# Patient Record
Sex: MALE | Race: BLACK OR AFRICAN AMERICAN | NOT HISPANIC OR LATINO | URBAN - METROPOLITAN AREA
[De-identification: names, ages, dates, MRNs, and addresses within clinical notes are randomized per-mention and may not be internally consistent; named-entity substitution may affect disease eponyms.]

---

## 2021-09-10 ENCOUNTER — INPATIENT (INPATIENT)
Facility: HOSPITAL | Age: 41
LOS: 3 days | Discharge: HOME | DRG: 935 | End: 2021-09-14
Attending: SURGERY | Admitting: SURGERY
Payer: COMMERCIAL

## 2021-09-10 VITALS
RESPIRATION RATE: 18 BRPM | OXYGEN SATURATION: 100 % | DIASTOLIC BLOOD PRESSURE: 95 MMHG | HEART RATE: 70 BPM | SYSTOLIC BLOOD PRESSURE: 156 MMHG
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
## 2021-09-10 LAB
ABO RH CONFIRMATION: SIGNIFICANT CHANGE UP
ALBUMIN SERPL ELPH-MCNC: 4.2 G/DL — SIGNIFICANT CHANGE UP (ref 3.3–5.2)
ALP SERPL-CCNC: 65 U/L — SIGNIFICANT CHANGE UP (ref 40–120)
ALT FLD-CCNC: 30 U/L — SIGNIFICANT CHANGE UP
ANION GAP SERPL CALC-SCNC: 12 MMOL/L — SIGNIFICANT CHANGE UP (ref 5–17)
APPEARANCE UR: CLEAR — SIGNIFICANT CHANGE UP
APTT BLD: 22.8 SEC — LOW (ref 27.5–35.5)
AST SERPL-CCNC: 74 U/L — HIGH
BACTERIA # UR AUTO: ABNORMAL
BASE EXCESS BLDV CALC-SCNC: -1.6 MMOL/L — SIGNIFICANT CHANGE UP (ref -2–3)
BASOPHILS # BLD AUTO: 0.03 K/UL — SIGNIFICANT CHANGE UP (ref 0–0.2)
BASOPHILS NFR BLD AUTO: 0.5 % — SIGNIFICANT CHANGE UP (ref 0–2)
BILIRUB SERPL-MCNC: 0.2 MG/DL — LOW (ref 0.4–2)
BILIRUB UR-MCNC: NEGATIVE — SIGNIFICANT CHANGE UP
BLD GP AB SCN SERPL QL: SIGNIFICANT CHANGE UP
BUN SERPL-MCNC: 12 MG/DL — SIGNIFICANT CHANGE UP (ref 8–20)
CALCIUM SERPL-MCNC: 8.3 MG/DL — LOW (ref 8.6–10.2)
CHLORIDE SERPL-SCNC: 106 MMOL/L — SIGNIFICANT CHANGE UP (ref 98–107)
CO2 SERPL-SCNC: 22 MMOL/L — SIGNIFICANT CHANGE UP (ref 22–29)
COLOR SPEC: YELLOW — SIGNIFICANT CHANGE UP
CREAT SERPL-MCNC: 1 MG/DL — SIGNIFICANT CHANGE UP (ref 0.5–1.3)
DIFF PNL FLD: NEGATIVE — SIGNIFICANT CHANGE UP
EOSINOPHIL # BLD AUTO: 0.16 K/UL — SIGNIFICANT CHANGE UP (ref 0–0.5)
EOSINOPHIL NFR BLD AUTO: 2.9 % — SIGNIFICANT CHANGE UP (ref 0–6)
EPI CELLS # UR: SIGNIFICANT CHANGE UP
ETHANOL SERPL-MCNC: <10 MG/DL — SIGNIFICANT CHANGE UP (ref 0–9)
GAS PNL BLDV: SIGNIFICANT CHANGE UP
GLUCOSE SERPL-MCNC: 156 MG/DL — HIGH (ref 70–99)
GLUCOSE UR QL: NEGATIVE MG/DL — SIGNIFICANT CHANGE UP
HCO3 BLDV-SCNC: 24 MMOL/L — SIGNIFICANT CHANGE UP (ref 22–29)
HCT VFR BLD CALC: 39.7 % — SIGNIFICANT CHANGE UP (ref 39–50)
HGB BLD-MCNC: 13.3 G/DL — SIGNIFICANT CHANGE UP (ref 13–17)
IMM GRANULOCYTES NFR BLD AUTO: 1.1 % — SIGNIFICANT CHANGE UP (ref 0–1.5)
INR BLD: 1 RATIO — SIGNIFICANT CHANGE UP (ref 0.88–1.16)
KETONES UR-MCNC: NEGATIVE — SIGNIFICANT CHANGE UP
LACTATE BLDV-MCNC: 1.4 MMOL/L — SIGNIFICANT CHANGE UP (ref 0.5–2)
LEUKOCYTE ESTERASE UR-ACNC: NEGATIVE — SIGNIFICANT CHANGE UP
LIDOCAIN IGE QN: 39 U/L — SIGNIFICANT CHANGE UP (ref 22–51)
LYMPHOCYTES # BLD AUTO: 2.33 K/UL — SIGNIFICANT CHANGE UP (ref 1–3.3)
LYMPHOCYTES # BLD AUTO: 41.5 % — SIGNIFICANT CHANGE UP (ref 13–44)
MCHC RBC-ENTMCNC: 31.7 PG — SIGNIFICANT CHANGE UP (ref 27–34)
MCHC RBC-ENTMCNC: 33.5 GM/DL — SIGNIFICANT CHANGE UP (ref 32–36)
MCV RBC AUTO: 94.7 FL — SIGNIFICANT CHANGE UP (ref 80–100)
MONOCYTES # BLD AUTO: 0.27 K/UL — SIGNIFICANT CHANGE UP (ref 0–0.9)
MONOCYTES NFR BLD AUTO: 4.8 % — SIGNIFICANT CHANGE UP (ref 2–14)
NEUTROPHILS # BLD AUTO: 2.76 K/UL — SIGNIFICANT CHANGE UP (ref 1.8–7.4)
NEUTROPHILS NFR BLD AUTO: 49.2 % — SIGNIFICANT CHANGE UP (ref 43–77)
NITRITE UR-MCNC: NEGATIVE — SIGNIFICANT CHANGE UP
PCO2 BLDV: 46 MMHG — SIGNIFICANT CHANGE UP (ref 42–55)
PH BLDV: 7.33 — SIGNIFICANT CHANGE UP (ref 7.32–7.43)
PH UR: 6.5 — SIGNIFICANT CHANGE UP (ref 5–8)
PLATELET # BLD AUTO: 167 K/UL — SIGNIFICANT CHANGE UP (ref 150–400)
PO2 BLDV: 163 MMHG — HIGH (ref 25–45)
POTASSIUM SERPL-MCNC: 4.3 MMOL/L — SIGNIFICANT CHANGE UP (ref 3.5–5.3)
POTASSIUM SERPL-SCNC: 4.3 MMOL/L — SIGNIFICANT CHANGE UP (ref 3.5–5.3)
PROT SERPL-MCNC: 6.9 G/DL — SIGNIFICANT CHANGE UP (ref 6.6–8.7)
PROT UR-MCNC: 30 MG/DL
PROTHROM AB SERPL-ACNC: 11.6 SEC — SIGNIFICANT CHANGE UP (ref 10.6–13.6)
RBC # BLD: 4.19 M/UL — LOW (ref 4.2–5.8)
RBC # FLD: 13.1 % — SIGNIFICANT CHANGE UP (ref 10.3–14.5)
RBC CASTS # UR COMP ASSIST: SIGNIFICANT CHANGE UP /HPF (ref 0–4)
SAO2 % BLDV: 100 % — SIGNIFICANT CHANGE UP
SARS-COV-2 RNA SPEC QL NAA+PROBE: SIGNIFICANT CHANGE UP
SODIUM SERPL-SCNC: 139 MMOL/L — SIGNIFICANT CHANGE UP (ref 135–145)
SP GR SPEC: 1.01 — SIGNIFICANT CHANGE UP (ref 1.01–1.02)
UROBILINOGEN FLD QL: NEGATIVE MG/DL — SIGNIFICANT CHANGE UP
WBC # BLD: 5.61 K/UL — SIGNIFICANT CHANGE UP (ref 3.8–10.5)
WBC # FLD AUTO: 5.61 K/UL — SIGNIFICANT CHANGE UP (ref 3.8–10.5)
WBC UR QL: SIGNIFICANT CHANGE UP

## 2021-09-10 PROCEDURE — 32551 INSERTION OF CHEST TUBE: CPT | Mod: GC

## 2021-09-10 PROCEDURE — 70450 CT HEAD/BRAIN W/O DYE: CPT | Mod: 26,MA

## 2021-09-10 PROCEDURE — 71045 X-RAY EXAM CHEST 1 VIEW: CPT | Mod: 26

## 2021-09-10 PROCEDURE — 74177 CT ABD & PELVIS W/CONTRAST: CPT | Mod: 26,MA

## 2021-09-10 PROCEDURE — 99156 MOD SED OTH PHYS/QHP 5/>YRS: CPT

## 2021-09-10 PROCEDURE — 99223 1ST HOSP IP/OBS HIGH 75: CPT | Mod: GC,25

## 2021-09-10 PROCEDURE — 99157 MOD SED OTHER PHYS/QHP EA: CPT

## 2021-09-10 PROCEDURE — 71260 CT THORAX DX C+: CPT | Mod: 26,MA

## 2021-09-10 PROCEDURE — 73000 X-RAY EXAM OF COLLAR BONE: CPT | Mod: 26,LT

## 2021-09-10 PROCEDURE — 99291 CRITICAL CARE FIRST HOUR: CPT

## 2021-09-10 PROCEDURE — 99053 MED SERV 10PM-8AM 24 HR FAC: CPT

## 2021-09-10 PROCEDURE — 72170 X-RAY EXAM OF PELVIS: CPT | Mod: 26

## 2021-09-10 PROCEDURE — 73070 X-RAY EXAM OF ELBOW: CPT | Mod: 26,RT,76

## 2021-09-10 PROCEDURE — 71045 X-RAY EXAM CHEST 1 VIEW: CPT | Mod: 26,77

## 2021-09-10 PROCEDURE — 72125 CT NECK SPINE W/O DYE: CPT | Mod: 26,MA

## 2021-09-10 RX ORDER — OXYCODONE HYDROCHLORIDE 5 MG/1
10 TABLET ORAL EVERY 6 HOURS
Refills: 0 | Status: DISCONTINUED | OUTPATIENT
Start: 2021-09-10 | End: 2021-09-14

## 2021-09-10 RX ORDER — KETAMINE HYDROCHLORIDE 100 MG/ML
50 INJECTION INTRAMUSCULAR; INTRAVENOUS ONCE
Refills: 0 | Status: DISCONTINUED | OUTPATIENT
Start: 2021-09-10 | End: 2021-09-10

## 2021-09-10 RX ORDER — ONDANSETRON 8 MG/1
4 TABLET, FILM COATED ORAL ONCE
Refills: 0 | Status: COMPLETED | OUTPATIENT
Start: 2021-09-10 | End: 2021-09-10

## 2021-09-10 RX ORDER — ENOXAPARIN SODIUM 100 MG/ML
40 INJECTION SUBCUTANEOUS DAILY
Refills: 0 | Status: DISCONTINUED | OUTPATIENT
Start: 2021-09-10 | End: 2021-09-14

## 2021-09-10 RX ORDER — SENNA PLUS 8.6 MG/1
2 TABLET ORAL AT BEDTIME
Refills: 0 | Status: DISCONTINUED | OUTPATIENT
Start: 2021-09-10 | End: 2021-09-14

## 2021-09-10 RX ORDER — BACITRACIN ZINC 500 UNIT/G
1 OINTMENT IN PACKET (EA) TOPICAL
Refills: 0 | Status: DISCONTINUED | OUTPATIENT
Start: 2021-09-10 | End: 2021-09-14

## 2021-09-10 RX ORDER — OXYCODONE HYDROCHLORIDE 5 MG/1
5 TABLET ORAL EVERY 6 HOURS
Refills: 0 | Status: DISCONTINUED | OUTPATIENT
Start: 2021-09-10 | End: 2021-09-14

## 2021-09-10 RX ORDER — LIDOCAINE 4 G/100G
1 CREAM TOPICAL DAILY
Refills: 0 | Status: DISCONTINUED | OUTPATIENT
Start: 2021-09-10 | End: 2021-09-14

## 2021-09-10 RX ORDER — HYDROMORPHONE HYDROCHLORIDE 2 MG/ML
1 INJECTION INTRAMUSCULAR; INTRAVENOUS; SUBCUTANEOUS
Refills: 0 | Status: DISCONTINUED | OUTPATIENT
Start: 2021-09-10 | End: 2021-09-14

## 2021-09-10 RX ORDER — SODIUM CHLORIDE 9 MG/ML
1000 INJECTION, SOLUTION INTRAVENOUS
Refills: 0 | Status: DISCONTINUED | OUTPATIENT
Start: 2021-09-10 | End: 2021-09-12

## 2021-09-10 RX ORDER — ACETAMINOPHEN 500 MG
975 TABLET ORAL EVERY 8 HOURS
Refills: 0 | Status: DISCONTINUED | OUTPATIENT
Start: 2021-09-10 | End: 2021-09-14

## 2021-09-10 RX ORDER — TETANUS TOXOID, REDUCED DIPHTHERIA TOXOID AND ACELLULAR PERTUSSIS VACCINE, ADSORBED 5; 2.5; 8; 8; 2.5 [IU]/.5ML; [IU]/.5ML; UG/.5ML; UG/.5ML; UG/.5ML
0.5 SUSPENSION INTRAMUSCULAR ONCE
Refills: 0 | Status: COMPLETED | OUTPATIENT
Start: 2021-09-10 | End: 2021-09-10

## 2021-09-10 RX ADMIN — HYDROMORPHONE HYDROCHLORIDE 1 MILLIGRAM(S): 2 INJECTION INTRAMUSCULAR; INTRAVENOUS; SUBCUTANEOUS at 21:44

## 2021-09-10 RX ADMIN — Medication 975 MILLIGRAM(S): at 13:48

## 2021-09-10 RX ADMIN — LIDOCAINE 1 PATCH: 4 CREAM TOPICAL at 18:44

## 2021-09-10 RX ADMIN — Medication 1 APPLICATION(S): at 17:00

## 2021-09-10 RX ADMIN — TETANUS TOXOID, REDUCED DIPHTHERIA TOXOID AND ACELLULAR PERTUSSIS VACCINE, ADSORBED 0.5 MILLILITER(S): 5; 2.5; 8; 8; 2.5 SUSPENSION INTRAMUSCULAR at 07:55

## 2021-09-10 RX ADMIN — ONDANSETRON 4 MILLIGRAM(S): 8 TABLET, FILM COATED ORAL at 23:53

## 2021-09-10 RX ADMIN — ENOXAPARIN SODIUM 40 MILLIGRAM(S): 100 INJECTION SUBCUTANEOUS at 13:47

## 2021-09-10 RX ADMIN — KETAMINE HYDROCHLORIDE 50 MILLIGRAM(S): 100 INJECTION INTRAMUSCULAR; INTRAVENOUS at 08:14

## 2021-09-10 RX ADMIN — KETAMINE HYDROCHLORIDE 50 MILLIGRAM(S): 100 INJECTION INTRAMUSCULAR; INTRAVENOUS at 08:03

## 2021-09-10 RX ADMIN — LIDOCAINE 1 PATCH: 4 CREAM TOPICAL at 13:47

## 2021-09-10 RX ADMIN — Medication 975 MILLIGRAM(S): at 21:44

## 2021-09-10 RX ADMIN — SODIUM CHLORIDE 83 MILLILITER(S): 9 INJECTION, SOLUTION INTRAVENOUS at 13:49

## 2021-09-10 NOTE — PROCEDURE NOTE - ADDITIONAL PROCEDURE DETAILS
Dimitrios Alexander presents to the clinic today for management of his anticoagulation therapy in treatment of his atrial fibrillation. Target INR is 2.0-3.0. His INR today was therapeutic at 2.1 on 37.5 mg of Warfarin weekly. His previous INR was 1.8 on 09/17/18. The patient reports and denies medication changes since the last visit. He  denies diet changes since the last visit. He was able to ambulate to office without assistive device. Dimitrios will continue on his current warfarin dosing schedule of 37.5 mg weekly and return to the clinic on 10/19/18 for INR fingerstick. Onsite billing provider is Kurtis Yen MD.     
ketamine 50 mg IV x 2 during left tube thoracostomy  he was awake and answering questions appropriately after the procedure was concluded.

## 2021-09-10 NOTE — ED PROVIDER NOTE - NS ED ROS FT
ROS:  GEN: (-) fevers/chills  NECK: (-) stiffness, (-) swelling  RESP: (-) shortness of breath, (-) cough  CV: (+) chest pain, (-) palpitations  GI: (-) nausea, (-) vomiting, (+) pain, (-) constipation, (-) diarrhea  : (-) hematuria, (-) dysuria  EXT: (-) edema  NEURO: (-) weakness, (-) headache, (-) dizziness, (-) syncope  MSK: (-) muscle pain

## 2021-09-10 NOTE — ED ADULT NURSE REASSESSMENT NOTE - NS ED NURSE REASSESS COMMENT FT1
received report from sisi bush and assumed care of pt. pt sitting calm in bed. a and o x3. breathing even and unlabored. nsr on cm. pt has no complaints at this time. admitted and awaiting bed upstairs. will continue to monitor.
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
## 2021-09-10 NOTE — PROCEDURE NOTE - NSPROCSEDATIONNOT_GEN_ALL_CORE
Patient Information  -No driving while taking narcotic pain medications  -Do not take any OTC products containing acetaminophen at the same time as you take your narcotic pain medication. Medications that may contain acetaminophen include but are not limited to: Excedrin and other headache medications, arthritis medications, cold and sinus medications, etc. Please review the list of active ingredients on any OTC medication prior to taking it.  -Do not take any Aspirin or Aspirin containing products until 48 hours after surgery   -Do not take any Aleeve, Naprosyn, Naproxen, Ibuprofen, Advil or any other NSAID  -Do not consume any alcoholic beverages until released by your Neurosurgeon  -Do not perform any excessive bending over or leaning forward as this is a fall hazard.  -Do not perform any heavy lifting or lifting more than 10 lbs from the ground level as this is a fall hazard.    Contact the Neurosurgery Office immediately if:  -If you begin to notice any neurologic changes such as:           -Sudden onset of lethargy or sleepiness           -Sudden confusion, trouble speaking, or understanding            -Sudden trouble seeing in one or both eyes            -Sudden trouble walking, dizziness, loss of coordination            -Sudden severe headache with no known cause            -Sudden onset of numbness or weakness     Wound Care:  Remove bandage on the 2nd day after surgery, then keep your incision open to air. There are white tape strips called steri strips under your bandage. These will fall off on their own. Do not remove them. You may shower on Day 2. Have the stream of water hit you opposite from the incision. Do not scrub the incision. Pat the incision dry after your shower. You cannot take a bath/swim/submerge the incision until 8 weeks after surgery.    Call your doctor or go to the Emergency Room for any signs of infection including: increased redness, drainage, pain or fever (temperature greater than 
or equal to 101.4).       Miscellaneous:  -Follow up with Neurosurgery in 2 weeks for wound check. You will also have a x-ray before your clinic appointment with Dr. Guerrero for your wound check.   -Remain active with walking and other light activities daily.  No heavy lifting, no extreme bending or twisting.    -Please wear your collar at all times. It is ok to remove it when you are showering. Please do not move your head/neck when the collar is off  -Please use your rolling walker for assistance        Neurosurgery Office:   [address], [address]  [place] [address]  Telephone [phone]    
No
Yes
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
## 2021-09-11 LAB
ANION GAP SERPL CALC-SCNC: 14 MMOL/L — SIGNIFICANT CHANGE UP (ref 5–17)
BASOPHILS # BLD AUTO: 0.01 K/UL — SIGNIFICANT CHANGE UP (ref 0–0.2)
BASOPHILS NFR BLD AUTO: 0.2 % — SIGNIFICANT CHANGE UP (ref 0–2)
BUN SERPL-MCNC: 11 MG/DL — SIGNIFICANT CHANGE UP (ref 8–20)
CALCIUM SERPL-MCNC: 8.3 MG/DL — LOW (ref 8.6–10.2)
CHLORIDE SERPL-SCNC: 99 MMOL/L — SIGNIFICANT CHANGE UP (ref 98–107)
CO2 SERPL-SCNC: 24 MMOL/L — SIGNIFICANT CHANGE UP (ref 22–29)
COVID-19 SPIKE DOMAIN AB INTERP: NEGATIVE — SIGNIFICANT CHANGE UP
COVID-19 SPIKE DOMAIN ANTIBODY RESULT: 0.4 U/ML — SIGNIFICANT CHANGE UP
CREAT SERPL-MCNC: 0.85 MG/DL — SIGNIFICANT CHANGE UP (ref 0.5–1.3)
EOSINOPHIL # BLD AUTO: 0.01 K/UL — SIGNIFICANT CHANGE UP (ref 0–0.5)
EOSINOPHIL NFR BLD AUTO: 0.2 % — SIGNIFICANT CHANGE UP (ref 0–6)
GLUCOSE SERPL-MCNC: 122 MG/DL — HIGH (ref 70–99)
HCT VFR BLD CALC: 36.3 % — LOW (ref 39–50)
HGB BLD-MCNC: 12.5 G/DL — LOW (ref 13–17)
IMM GRANULOCYTES NFR BLD AUTO: 0.2 % — SIGNIFICANT CHANGE UP (ref 0–1.5)
LYMPHOCYTES # BLD AUTO: 0.9 K/UL — LOW (ref 1–3.3)
LYMPHOCYTES # BLD AUTO: 13.6 % — SIGNIFICANT CHANGE UP (ref 13–44)
MAGNESIUM SERPL-MCNC: 1.7 MG/DL — SIGNIFICANT CHANGE UP (ref 1.6–2.6)
MCHC RBC-ENTMCNC: 32.2 PG — SIGNIFICANT CHANGE UP (ref 27–34)
MCHC RBC-ENTMCNC: 34.4 GM/DL — SIGNIFICANT CHANGE UP (ref 32–36)
MCV RBC AUTO: 93.6 FL — SIGNIFICANT CHANGE UP (ref 80–100)
MONOCYTES # BLD AUTO: 0.6 K/UL — SIGNIFICANT CHANGE UP (ref 0–0.9)
MONOCYTES NFR BLD AUTO: 9.1 % — SIGNIFICANT CHANGE UP (ref 2–14)
NEUTROPHILS # BLD AUTO: 5.07 K/UL — SIGNIFICANT CHANGE UP (ref 1.8–7.4)
NEUTROPHILS NFR BLD AUTO: 76.7 % — SIGNIFICANT CHANGE UP (ref 43–77)
PHOSPHATE SERPL-MCNC: 3.8 MG/DL — SIGNIFICANT CHANGE UP (ref 2.4–4.7)
PLATELET # BLD AUTO: 156 K/UL — SIGNIFICANT CHANGE UP (ref 150–400)
POTASSIUM SERPL-MCNC: 4 MMOL/L — SIGNIFICANT CHANGE UP (ref 3.5–5.3)
POTASSIUM SERPL-SCNC: 4 MMOL/L — SIGNIFICANT CHANGE UP (ref 3.5–5.3)
RBC # BLD: 3.88 M/UL — LOW (ref 4.2–5.8)
RBC # FLD: 13.1 % — SIGNIFICANT CHANGE UP (ref 10.3–14.5)
SARS-COV-2 IGG+IGM SERPL QL IA: 0.4 U/ML — SIGNIFICANT CHANGE UP
SARS-COV-2 IGG+IGM SERPL QL IA: NEGATIVE — SIGNIFICANT CHANGE UP
SODIUM SERPL-SCNC: 136 MMOL/L — SIGNIFICANT CHANGE UP (ref 135–145)
WBC # BLD: 6.6 K/UL — SIGNIFICANT CHANGE UP (ref 3.8–10.5)
WBC # FLD AUTO: 6.6 K/UL — SIGNIFICANT CHANGE UP (ref 3.8–10.5)

## 2021-09-11 PROCEDURE — 71045 X-RAY EXAM CHEST 1 VIEW: CPT | Mod: 26

## 2021-09-11 PROCEDURE — 99232 SBSQ HOSP IP/OBS MODERATE 35: CPT

## 2021-09-11 RX ORDER — METHOCARBAMOL 500 MG/1
750 TABLET, FILM COATED ORAL
Refills: 0 | Status: DISCONTINUED | OUTPATIENT
Start: 2021-09-11 | End: 2021-09-14

## 2021-09-11 RX ORDER — GABAPENTIN 400 MG/1
300 CAPSULE ORAL THREE TIMES A DAY
Refills: 0 | Status: DISCONTINUED | OUTPATIENT
Start: 2021-09-11 | End: 2021-09-14

## 2021-09-11 RX ORDER — INFLUENZA VIRUS VACCINE 15; 15; 15; 15 UG/.5ML; UG/.5ML; UG/.5ML; UG/.5ML
0.5 SUSPENSION INTRAMUSCULAR ONCE
Refills: 0 | Status: DISCONTINUED | OUTPATIENT
Start: 2021-09-11 | End: 2021-09-14

## 2021-09-11 RX ORDER — MAGNESIUM OXIDE 400 MG ORAL TABLET 241.3 MG
400 TABLET ORAL
Refills: 0 | Status: COMPLETED | OUTPATIENT
Start: 2021-09-11 | End: 2021-09-12

## 2021-09-11 RX ADMIN — LIDOCAINE 1 PATCH: 4 CREAM TOPICAL at 23:23

## 2021-09-11 RX ADMIN — GABAPENTIN 300 MILLIGRAM(S): 400 CAPSULE ORAL at 12:34

## 2021-09-11 RX ADMIN — Medication 1 APPLICATION(S): at 05:04

## 2021-09-11 RX ADMIN — Medication 975 MILLIGRAM(S): at 05:04

## 2021-09-11 RX ADMIN — HYDROMORPHONE HYDROCHLORIDE 1 MILLIGRAM(S): 2 INJECTION INTRAMUSCULAR; INTRAVENOUS; SUBCUTANEOUS at 17:12

## 2021-09-11 RX ADMIN — Medication 1 APPLICATION(S): at 17:12

## 2021-09-11 RX ADMIN — METHOCARBAMOL 750 MILLIGRAM(S): 500 TABLET, FILM COATED ORAL at 16:18

## 2021-09-11 RX ADMIN — MAGNESIUM OXIDE 400 MG ORAL TABLET 400 MILLIGRAM(S): 241.3 TABLET ORAL at 12:18

## 2021-09-11 RX ADMIN — HYDROMORPHONE HYDROCHLORIDE 1 MILLIGRAM(S): 2 INJECTION INTRAMUSCULAR; INTRAVENOUS; SUBCUTANEOUS at 12:33

## 2021-09-11 RX ADMIN — HYDROMORPHONE HYDROCHLORIDE 1 MILLIGRAM(S): 2 INJECTION INTRAMUSCULAR; INTRAVENOUS; SUBCUTANEOUS at 05:04

## 2021-09-11 RX ADMIN — Medication 975 MILLIGRAM(S): at 12:17

## 2021-09-11 RX ADMIN — ENOXAPARIN SODIUM 40 MILLIGRAM(S): 100 INJECTION SUBCUTANEOUS at 12:33

## 2021-09-11 RX ADMIN — LIDOCAINE 1 PATCH: 4 CREAM TOPICAL at 12:18

## 2021-09-11 RX ADMIN — GABAPENTIN 300 MILLIGRAM(S): 400 CAPSULE ORAL at 21:16

## 2021-09-11 RX ADMIN — METHOCARBAMOL 750 MILLIGRAM(S): 500 TABLET, FILM COATED ORAL at 23:23

## 2021-09-11 RX ADMIN — Medication 975 MILLIGRAM(S): at 21:16

## 2021-09-11 RX ADMIN — LIDOCAINE 1 PATCH: 4 CREAM TOPICAL at 19:30
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
## 2021-09-12 LAB
ANION GAP SERPL CALC-SCNC: 13 MMOL/L — SIGNIFICANT CHANGE UP (ref 5–17)
BUN SERPL-MCNC: 12.4 MG/DL — SIGNIFICANT CHANGE UP (ref 8–20)
CALCIUM SERPL-MCNC: 8.9 MG/DL — SIGNIFICANT CHANGE UP (ref 8.6–10.2)
CHLORIDE SERPL-SCNC: 101 MMOL/L — SIGNIFICANT CHANGE UP (ref 98–107)
CO2 SERPL-SCNC: 26 MMOL/L — SIGNIFICANT CHANGE UP (ref 22–29)
CREAT SERPL-MCNC: 0.97 MG/DL — SIGNIFICANT CHANGE UP (ref 0.5–1.3)
GLUCOSE SERPL-MCNC: 97 MG/DL — SIGNIFICANT CHANGE UP (ref 70–99)
HCT VFR BLD CALC: 33.6 % — LOW (ref 39–50)
HGB BLD-MCNC: 11.8 G/DL — LOW (ref 13–17)
MAGNESIUM SERPL-MCNC: 1.8 MG/DL — SIGNIFICANT CHANGE UP (ref 1.8–2.6)
MCHC RBC-ENTMCNC: 32.5 PG — SIGNIFICANT CHANGE UP (ref 27–34)
MCHC RBC-ENTMCNC: 35.1 GM/DL — SIGNIFICANT CHANGE UP (ref 32–36)
MCV RBC AUTO: 92.6 FL — SIGNIFICANT CHANGE UP (ref 80–100)
PHOSPHATE SERPL-MCNC: 2.9 MG/DL — SIGNIFICANT CHANGE UP (ref 2.4–4.7)
PLATELET # BLD AUTO: 145 K/UL — LOW (ref 150–400)
POTASSIUM SERPL-MCNC: 3.9 MMOL/L — SIGNIFICANT CHANGE UP (ref 3.5–5.3)
POTASSIUM SERPL-SCNC: 3.9 MMOL/L — SIGNIFICANT CHANGE UP (ref 3.5–5.3)
RBC # BLD: 3.63 M/UL — LOW (ref 4.2–5.8)
RBC # FLD: 12.6 % — SIGNIFICANT CHANGE UP (ref 10.3–14.5)
SODIUM SERPL-SCNC: 140 MMOL/L — SIGNIFICANT CHANGE UP (ref 135–145)
WBC # BLD: 5.37 K/UL — SIGNIFICANT CHANGE UP (ref 3.8–10.5)
WBC # FLD AUTO: 5.37 K/UL — SIGNIFICANT CHANGE UP (ref 3.8–10.5)

## 2021-09-12 PROCEDURE — 71045 X-RAY EXAM CHEST 1 VIEW: CPT | Mod: 26

## 2021-09-12 PROCEDURE — 99231 SBSQ HOSP IP/OBS SF/LOW 25: CPT | Mod: GC

## 2021-09-12 RX ADMIN — OXYCODONE HYDROCHLORIDE 10 MILLIGRAM(S): 5 TABLET ORAL at 17:33

## 2021-09-12 RX ADMIN — METHOCARBAMOL 750 MILLIGRAM(S): 500 TABLET, FILM COATED ORAL at 12:56

## 2021-09-12 RX ADMIN — METHOCARBAMOL 750 MILLIGRAM(S): 500 TABLET, FILM COATED ORAL at 05:18

## 2021-09-12 RX ADMIN — Medication 975 MILLIGRAM(S): at 05:17

## 2021-09-12 RX ADMIN — METHOCARBAMOL 750 MILLIGRAM(S): 500 TABLET, FILM COATED ORAL at 21:17

## 2021-09-12 RX ADMIN — MAGNESIUM OXIDE 400 MG ORAL TABLET 400 MILLIGRAM(S): 241.3 TABLET ORAL at 09:37

## 2021-09-12 RX ADMIN — Medication 1 APPLICATION(S): at 17:33

## 2021-09-12 RX ADMIN — METHOCARBAMOL 750 MILLIGRAM(S): 500 TABLET, FILM COATED ORAL at 17:33

## 2021-09-12 RX ADMIN — Medication 1 APPLICATION(S): at 05:18

## 2021-09-12 RX ADMIN — Medication 975 MILLIGRAM(S): at 12:56

## 2021-09-12 RX ADMIN — Medication 975 MILLIGRAM(S): at 22:19

## 2021-09-12 RX ADMIN — GABAPENTIN 300 MILLIGRAM(S): 400 CAPSULE ORAL at 12:56

## 2021-09-12 RX ADMIN — GABAPENTIN 300 MILLIGRAM(S): 400 CAPSULE ORAL at 21:17

## 2021-09-12 RX ADMIN — HYDROMORPHONE HYDROCHLORIDE 1 MILLIGRAM(S): 2 INJECTION INTRAMUSCULAR; INTRAVENOUS; SUBCUTANEOUS at 21:17

## 2021-09-12 RX ADMIN — HYDROMORPHONE HYDROCHLORIDE 1 MILLIGRAM(S): 2 INJECTION INTRAMUSCULAR; INTRAVENOUS; SUBCUTANEOUS at 10:06

## 2021-09-12 RX ADMIN — OXYCODONE HYDROCHLORIDE 10 MILLIGRAM(S): 5 TABLET ORAL at 06:23

## 2021-09-12 RX ADMIN — GABAPENTIN 300 MILLIGRAM(S): 400 CAPSULE ORAL at 05:18

## 2021-09-12 RX ADMIN — HYDROMORPHONE HYDROCHLORIDE 1 MILLIGRAM(S): 2 INJECTION INTRAMUSCULAR; INTRAVENOUS; SUBCUTANEOUS at 22:19

## 2021-09-12 RX ADMIN — HYDROMORPHONE HYDROCHLORIDE 1 MILLIGRAM(S): 2 INJECTION INTRAMUSCULAR; INTRAVENOUS; SUBCUTANEOUS at 09:51

## 2021-09-12 RX ADMIN — Medication 975 MILLIGRAM(S): at 21:17

## 2021-09-12 RX ADMIN — Medication 975 MILLIGRAM(S): at 13:50

## 2021-09-12 RX ADMIN — OXYCODONE HYDROCHLORIDE 10 MILLIGRAM(S): 5 TABLET ORAL at 05:17

## 2021-09-12 RX ADMIN — OXYCODONE HYDROCHLORIDE 10 MILLIGRAM(S): 5 TABLET ORAL at 18:30

## 2021-09-12 RX ADMIN — Medication 975 MILLIGRAM(S): at 06:23

## 2021-09-12 RX ADMIN — LIDOCAINE 1 PATCH: 4 CREAM TOPICAL at 21:16

## 2021-09-12 RX ADMIN — ENOXAPARIN SODIUM 40 MILLIGRAM(S): 100 INJECTION SUBCUTANEOUS at 12:56

## 2021-09-12 RX ADMIN — LIDOCAINE 1 PATCH: 4 CREAM TOPICAL at 12:57

## 2021-09-12 NOTE — PROGRESS NOTE ADULT - SUBJECTIVE AND OBJECTIVE BOX
INTERVAL HPI/OVERNIGHT EVENTS:    Patient evaluated at bedside. No acute distress. No acute events overnight.  Mild pain at the left scapular and shoulder.   Tolerating PO.   Chest tube to suction.      MEDICATIONS  (STANDING):  acetaminophen   Tablet .. 975 milliGRAM(s) Oral every 8 hours  BACItracin   Ointment 1 Application(s) Topical two times a day  enoxaparin Injectable 40 milliGRAM(s) SubCutaneous daily  gabapentin 300 milliGRAM(s) Oral three times a day  influenza   Vaccine 0.5 milliLiter(s) IntraMuscular once  lidocaine   4% Patch 1 Patch Transdermal daily  magnesium oxide 400 milliGRAM(s) Oral three times a day with meals  methocarbamol 750 milliGRAM(s) Oral four times a day  multiple electrolytes Injection Type 1 1000 milliLiter(s) (83 mL/Hr) IV Continuous <Continuous>  senna 2 Tablet(s) Oral at bedtime    MEDICATIONS  (PRN):  HYDROmorphone  Injectable 1 milliGRAM(s) IV Push every 3 hours PRN Breakthrough pain  oxyCODONE    IR 5 milliGRAM(s) Oral every 6 hours PRN Moderate Pain (4 - 6)  oxyCODONE    IR 10 milliGRAM(s) Oral every 6 hours PRN Severe Pain (7 - 10)      Vital Signs Last 24 Hrs  T(C): 36.7 (11 Sep 2021 22:45), Max: 37.6 (11 Sep 2021 22:09)  T(F): 98.1 (11 Sep 2021 22:45), Max: 99.6 (11 Sep 2021 22:09)  HR: 66 (11 Sep 2021 22:45) (54 - 66)  BP: 132/77 (11 Sep 2021 22:45) (123/67 - 166/90)  BP(mean): --  RR: 18 (11 Sep 2021 22:45) (17 - 18)  SpO2: 97% (11 Sep 2021 22:45) (97% - 100%)    Constitutional: NAD  HEENT: EOMI, no drainage or redness  Respiratory: non labored breathing, chest tube in place to wall suction  Cardiovascular: Regular rate & rhythm,   Gastrointestinal: Soft, non-tender, no hepatosplenomegaly, normal bowel sounds  Extremities: No peripheral edema, No cyanosis, clubbing   Vascular: Equal and normal pulses: 2+ peripheral pulses throughout  Neurological: A&O x 3; no sensory, motor or coordination deficits, normal reflexes  Psychiatric: Normal mood, normal affect  Musculoskeletal: No joint pain, swelling or deformity; no limitation of movement  Skin: No rashes      I&O's Detail    10 Sep 2021 07:01  -  11 Sep 2021 07:00  --------------------------------------------------------  IN:  Total IN: 0 mL    OUT:    Chest Tube (mL): 35 mL  Total OUT: 35 mL    Total NET: -35 mL      11 Sep 2021 07:01  -  12 Sep 2021 02:13  --------------------------------------------------------  IN:  Total IN: 0 mL    OUT:    Voided (mL): 300 mL  Total OUT: 300 mL    Total NET: -300 mL              LABS:                        12.5   6.60  )-----------( 156      ( 11 Sep 2021 03:28 )             36.3     09-11    136  |  99  |  11.0  ----------------------------<  122<H>  4.0   |  24.0  |  0.85    Ca    8.3<L>      11 Sep 2021 03:28  Phos  3.8     09-11  Mg     1.7     -11    TPro  6.9  /  Alb  4.2  /  TBili  0.2<L>  /  DBili  x   /  AST  74<H>  /  ALT  30  /  AlkPhos  65  09-10    PT/INR - ( 10 Sep 2021 07:23 )   PT: 11.6 sec;   INR: 1.00 ratio         PTT - ( 10 Sep 2021 07:23 )  PTT:22.8 sec  Urinalysis Basic - ( 10 Sep 2021 15:59 )    Color: Yellow / Appearance: Clear / S.010 / pH: x  Gluc: x / Ketone: Negative  / Bili: Negative / Urobili: Negative mg/dL   Blood: x / Protein: 30 mg/dL / Nitrite: Negative   Leuk Esterase: Negative / RBC: 0-2 /HPF / WBC 0-2   Sq Epi: x / Non Sq Epi: Occasional / Bacteria: Occasional        RADIOLOGY & ADDITIONAL STUDIES:

## 2021-09-12 NOTE — PHYSICAL THERAPY INITIAL EVALUATION ADULT - ASR EQUIP NEEDS DISCH PT EVAL
every 6 hours as needed for Wheezing or Shortness of Breath 2/15/18   Kim Capone MD       Allergies:  Patient has no known allergies. Social History:    Tobacco:  reports that he quit smoking about 28 years ago. His smoking use included cigarettes. He has a 20.00 pack-year smoking history. He has never used smokeless tobacco.   Alcohol:  reports that he drinks alcohol. Illicit Drug: No  Family History:       Problem Relation Age of Onset   [facility] Cancer Mother         breast    Diabetes Mother     Diabetes Father     Hypertension Father     Parkinsonism Other      REVIEW OF SYSTEMS:  CONSTITUTIONAL:  negative  MUSCULOSKELETAL:  positive for  pain    PHYSICAL EXAM:  Admission weight: 225 lb (102.1 kg)  5' 9\" (175.3 cm)  VITALS:  /64   Pulse 67   Temp 97.5 °F (36.4 °C) (Temporal)   Resp 16   Ht 5' 9\" (1.753 m)   Wt 242 lb (109.8 kg)   BMI 35.74 kg/m²     CONSTITUTIONAL:  awake, alert, cooperative, no apparent distress, and appears stated age  Cardiac; regular rate and rhythm  Pulmonary; clear to auscultation bilaterally  MUSCULOSKELETAL:  There is no redness, warmth, or swelling of the joints. Full range of motion noted. Motor strength is 5 out of 5 all extremities bilaterally.   Tone is normal.    DATA:  CBC:   Lab Results   Component Value Date    WBC 10.2 06/21/2019    RBC 4.81 06/21/2019    HGB 16.2 06/21/2019    HCT 46.7 06/21/2019    MCV 97.0 06/21/2019    MCH 33.7 06/21/2019    MCHC 34.7 06/21/2019    RDW 13.0 06/21/2019     06/21/2019    MPV 8.9 06/21/2019     BMP:    Lab Results   Component Value Date     06/21/2019    K 4.4 06/21/2019     06/21/2019    CO2 25 06/21/2019    BUN 19 06/21/2019    LABALBU 4.6 06/21/2019    CREATININE 1.0 06/21/2019    CALCIUM 9.8 06/21/2019    GFRAA >60 06/21/2019    GFRAA >60 10/15/2012    LABGLOM >60 06/21/2019    GLUCOSE 143 06/21/2019     PT/INR:  No results found for: PROTIME, INR    Radiology:  No orders to display rolling walker (5 inch wheels)
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
## 2021-09-12 NOTE — PHYSICAL THERAPY INITIAL EVALUATION ADULT - ADDITIONAL COMMENTS
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 Patient lives alone in house with 2 TREY no HR and 10 steps with railing to bedroom and bathroom. First floor is rented to another person. Patient states he has a cousin in the area that "could help out."
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
## 2021-09-12 NOTE — PHYSICAL THERAPY INITIAL EVALUATION ADULT - RANGE OF MOTION EXAMINATION, REHAB EVAL
LUE limited by pain at shoulder./Right UE ROM was WNL (within normal limits)/Left LE ROM was WNL (within normal limits)/Right LE ROM was WNL (within normal limits)/deficits as listed below

## 2021-09-13 ENCOUNTER — TRANSCRIPTION ENCOUNTER (OUTPATIENT)
Age: 41
End: 2021-09-13

## 2021-09-13 PROCEDURE — 99232 SBSQ HOSP IP/OBS MODERATE 35: CPT

## 2021-09-13 PROCEDURE — 71045 X-RAY EXAM CHEST 1 VIEW: CPT | Mod: 26

## 2021-09-13 RX ORDER — OXYCODONE AND ACETAMINOPHEN 5; 325 MG/1; MG/1
1 TABLET ORAL
Qty: 12 | Refills: 0
Start: 2021-09-13 | End: 2021-09-15

## 2021-09-13 RX ORDER — METHOCARBAMOL 500 MG/1
1 TABLET, FILM COATED ORAL
Qty: 12 | Refills: 0
Start: 2021-09-13 | End: 2021-09-15

## 2021-09-13 RX ORDER — GABAPENTIN 400 MG/1
1 CAPSULE ORAL
Qty: 15 | Refills: 0
Start: 2021-09-13 | End: 2021-09-17

## 2021-09-13 RX ORDER — BACITRACIN ZINC 500 UNIT/G
1 OINTMENT IN PACKET (EA) TOPICAL
Qty: 0 | Refills: 0 | DISCHARGE
Start: 2021-09-13

## 2021-09-13 RX ORDER — SENNA PLUS 8.6 MG/1
2 TABLET ORAL
Qty: 0 | Refills: 0 | DISCHARGE
Start: 2021-09-13

## 2021-09-13 RX ADMIN — OXYCODONE HYDROCHLORIDE 10 MILLIGRAM(S): 5 TABLET ORAL at 16:30

## 2021-09-13 RX ADMIN — METHOCARBAMOL 750 MILLIGRAM(S): 500 TABLET, FILM COATED ORAL at 21:51

## 2021-09-13 RX ADMIN — Medication 975 MILLIGRAM(S): at 05:25

## 2021-09-13 RX ADMIN — LIDOCAINE 1 PATCH: 4 CREAM TOPICAL at 20:32

## 2021-09-13 RX ADMIN — LIDOCAINE 1 PATCH: 4 CREAM TOPICAL at 00:03

## 2021-09-13 RX ADMIN — GABAPENTIN 300 MILLIGRAM(S): 400 CAPSULE ORAL at 05:25

## 2021-09-13 RX ADMIN — Medication 975 MILLIGRAM(S): at 21:48

## 2021-09-13 RX ADMIN — Medication 975 MILLIGRAM(S): at 22:48

## 2021-09-13 RX ADMIN — GABAPENTIN 300 MILLIGRAM(S): 400 CAPSULE ORAL at 15:34

## 2021-09-13 RX ADMIN — GABAPENTIN 300 MILLIGRAM(S): 400 CAPSULE ORAL at 21:48

## 2021-09-13 RX ADMIN — Medication 1 APPLICATION(S): at 05:25

## 2021-09-13 RX ADMIN — LIDOCAINE 1 PATCH: 4 CREAM TOPICAL at 12:35

## 2021-09-13 RX ADMIN — METHOCARBAMOL 750 MILLIGRAM(S): 500 TABLET, FILM COATED ORAL at 12:36

## 2021-09-13 RX ADMIN — SENNA PLUS 2 TABLET(S): 8.6 TABLET ORAL at 21:48

## 2021-09-13 RX ADMIN — OXYCODONE HYDROCHLORIDE 10 MILLIGRAM(S): 5 TABLET ORAL at 15:30

## 2021-09-13 RX ADMIN — METHOCARBAMOL 750 MILLIGRAM(S): 500 TABLET, FILM COATED ORAL at 05:25

## 2021-09-13 RX ADMIN — Medication 975 MILLIGRAM(S): at 15:30

## 2021-09-13 RX ADMIN — OXYCODONE HYDROCHLORIDE 10 MILLIGRAM(S): 5 TABLET ORAL at 05:25

## 2021-09-13 RX ADMIN — ENOXAPARIN SODIUM 40 MILLIGRAM(S): 100 INJECTION SUBCUTANEOUS at 12:36

## 2021-09-13 NOTE — PROGRESS NOTE ADULT - SUBJECTIVE AND OBJECTIVE BOX
INTERVAL HPI/OVERNIGHT EVENTS:    Patient evaluated at bedside. No acute distress. No acute events overnight. CT remains to suction with + airleak, tidaling and serosang o/p. Pain is well controlled at this time, patient resting. Tolerating diet.         MEDICATIONS  (STANDING):  acetaminophen   Tablet .. 975 milliGRAM(s) Oral every 8 hours  BACItracin   Ointment 1 Application(s) Topical two times a day  enoxaparin Injectable 40 milliGRAM(s) SubCutaneous daily  gabapentin 300 milliGRAM(s) Oral three times a day  influenza   Vaccine 0.5 milliLiter(s) IntraMuscular once  lidocaine   4% Patch 1 Patch Transdermal daily  magnesium oxide 400 milliGRAM(s) Oral three times a day with meals  methocarbamol 750 milliGRAM(s) Oral four times a day  multiple electrolytes Injection Type 1 1000 milliLiter(s) (83 mL/Hr) IV Continuous <Continuous>  senna 2 Tablet(s) Oral at bedtime    MEDICATIONS  (PRN):  HYDROmorphone  Injectable 1 milliGRAM(s) IV Push every 3 hours PRN Breakthrough pain  oxyCODONE    IR 5 milliGRAM(s) Oral every 6 hours PRN Moderate Pain (4 - 6)  oxyCODONE    IR 10 milliGRAM(s) Oral every 6 hours PRN Severe Pain (7 - 10)    Vital Signs Last 24 Hrs  T(C): 36.8 (12 Sep 2021 21:00), Max: 37.2 (12 Sep 2021 05:00)  T(F): 98.2 (12 Sep 2021 21:00), Max: 99 (12 Sep 2021 05:00)  HR: 79 (12 Sep 2021 21:00) (63 - 81)  BP: 118/77 (12 Sep 2021 21:00) (118/77 - 128/76)  BP(mean): --  RR: 18 (12 Sep 2021 21:00) (18 - 18)  SpO2: 96% (12 Sep 2021 21:00) (95% - 96%)    Constitutional: NAD  HEENT: EOMI, no drainage or redness  Respiratory: non labored breathing, chest tube in place to wall suction. + ss o/p,  + airleak with tidaling, sating well on RA   Cardiovascular: Regular rate & rhythm,   Gastrointestinal: Soft, non-tender, no hepatosplenomegaly, normal bowel sounds  Extremities: No peripheral edema, No cyanosis, clubbing   Vascular: Equal and normal pulses: 2+ peripheral pulses throughout  Neurological: A&O x 3; no sensory, motor or coordination deficits, normal reflexes  Psychiatric: Normal mood, normal affect  Musculoskeletal: No joint pain, swelling or deformity; no limitation of movement  Skin: No rashes    I&O's Detail    11 Sep 2021 07:01  -  12 Sep 2021 07:00  --------------------------------------------------------  IN:  Total IN: 0 mL    OUT:    Voided (mL): 300 mL  Total OUT: 300 mL    Total NET: -300 mL      12 Sep 2021 07:01  -  13 Sep 2021 04:54  --------------------------------------------------------  IN:  Total IN: 0 mL    OUT:    Chest Tube (mL): 20 mL    Voided (mL): 400 mL  Total OUT: 420 mL    Total NET: -420 mL        Total NET: -300 mL              LABS:                                   11.8   5.37  )-----------( 145      ( 12 Sep 2021 06:42 )             33.6       09-12    140  |  101  |  12.4  ----------------------------<  97  3.9   |  26.0  |  0.97    Ca    8.9      12 Sep 2021 06:42  Phos  2.9     09-12  Mg     1.8     09-12           PTT - ( 10 Sep 2021 07:23 )  PTT:22.8 sec  Urinalysis Basic - ( 10 Sep 2021 15:59 )    Color: Yellow / Appearance: Clear / S.010 / pH: x  Gluc: x / Ketone: Negative  / Bili: Negative / Urobili: Negative mg/dL   Blood: x / Protein: 30 mg/dL / Nitrite: Negative   Leuk Esterase: Negative / RBC: 0-2 /HPF / WBC 0-2   Sq Epi: x / Non Sq Epi: Occasional / Bacteria: Occasional        RADIOLOGY & ADDITIONAL STUDIES:             INTERVAL HPI/OVERNIGHT EVENTS:    Patient evaluated at bedside. No acute distress. No acute events overnight. CT remains to suction with + airleak, tidaling and serosang o/p. Pain is well controlled at this time, patient resting. Tolerating diet.     On imaging senitel hole not clearly identified in cavity, no pneumo.         MEDICATIONS  (STANDING):  acetaminophen   Tablet .. 975 milliGRAM(s) Oral every 8 hours  BACItracin   Ointment 1 Application(s) Topical two times a day  enoxaparin Injectable 40 milliGRAM(s) SubCutaneous daily  gabapentin 300 milliGRAM(s) Oral three times a day  influenza   Vaccine 0.5 milliLiter(s) IntraMuscular once  lidocaine   4% Patch 1 Patch Transdermal daily  magnesium oxide 400 milliGRAM(s) Oral three times a day with meals  methocarbamol 750 milliGRAM(s) Oral four times a day  multiple electrolytes Injection Type 1 1000 milliLiter(s) (83 mL/Hr) IV Continuous <Continuous>  senna 2 Tablet(s) Oral at bedtime    MEDICATIONS  (PRN):  HYDROmorphone  Injectable 1 milliGRAM(s) IV Push every 3 hours PRN Breakthrough pain  oxyCODONE    IR 5 milliGRAM(s) Oral every 6 hours PRN Moderate Pain (4 - 6)  oxyCODONE    IR 10 milliGRAM(s) Oral every 6 hours PRN Severe Pain (7 - 10)    Vital Signs Last 24 Hrs  T(C): 36.8 (12 Sep 2021 21:00), Max: 37.2 (12 Sep 2021 05:00)  T(F): 98.2 (12 Sep 2021 21:00), Max: 99 (12 Sep 2021 05:00)  HR: 79 (12 Sep 2021 21:00) (63 - 81)  BP: 118/77 (12 Sep 2021 21:00) (118/77 - 128/76)  BP(mean): --  RR: 18 (12 Sep 2021 21:00) (18 - 18)  SpO2: 96% (12 Sep 2021 21:00) (95% - 96%)    Constitutional: NAD  HEENT: EOMI, no drainage or redness  Respiratory: non labored breathing, chest tube in place to wall suction. + ss o/p,  + airleak with tidaling, sating well on RA   Cardiovascular: Regular rate & rhythm,   Gastrointestinal: Soft, non-tender, no hepatosplenomegaly, normal bowel sounds  Extremities: No peripheral edema, No cyanosis, clubbing   Vascular: Equal and normal pulses: 2+ peripheral pulses throughout  Neurological: A&O x 3; no sensory, motor or coordination deficits, normal reflexes  Psychiatric: Normal mood, normal affect  Musculoskeletal: No joint pain, swelling or deformity; no limitation of movement  Skin: No rashes    I&O's Detail    11 Sep 2021 07:01  -  12 Sep 2021 07:00  --------------------------------------------------------  IN:  Total IN: 0 mL    OUT:    Voided (mL): 300 mL  Total OUT: 300 mL    Total NET: -300 mL      12 Sep 2021 07:01  -  13 Sep 2021 04:54  --------------------------------------------------------  IN:  Total IN: 0 mL    OUT:    Chest Tube (mL): 20 mL    Voided (mL): 400 mL  Total OUT: 420 mL    Total NET: -420 mL        Total NET: -300 mL              LABS:                                   11.8   5.37  )-----------( 145      ( 12 Sep 2021 06:42 )             33.6       09-12    140  |  101  |  12.4  ----------------------------<  97  3.9   |  26.0  |  0.97    Ca    8.9      12 Sep 2021 06:42  Phos  2.9     09-12  Mg     1.8     09-12           PTT - ( 10 Sep 2021 07:23 )  PTT:22.8 sec  Urinalysis Basic - ( 10 Sep 2021 15:59 )    Color: Yellow / Appearance: Clear / S.010 / pH: x  Gluc: x / Ketone: Negative  / Bili: Negative / Urobili: Negative mg/dL   Blood: x / Protein: 30 mg/dL / Nitrite: Negative   Leuk Esterase: Negative / RBC: 0-2 /HPF / WBC 0-2   Sq Epi: x / Non Sq Epi: Occasional / Bacteria: Occasional        RADIOLOGY & ADDITIONAL STUDIES:

## 2021-09-13 NOTE — DISCHARGE NOTE PROVIDER - NSDCMRMEDTOKEN_GEN_ALL_CORE_FT
bacitracin 500 units/g topical ointment: 1 application topically 2 times a day  gabapentin 300 mg oral capsule: 1 cap(s) orally 3 times a day  methocarbamol 750 mg oral tablet: 1 tab(s) orally 4 times a day as needed for muscle spasm  Percocet 5 mg-325 mg oral tablet: 1 tab(s) orally every 6 hours as needed for severe pain MDD:4  senna oral tablet: 2 tab(s) orally once a day (at bedtime)
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
## 2021-09-13 NOTE — DISCHARGE NOTE PROVIDER - NSDCFUADDINST_GEN_ALL_CORE_FT
ORTHOPEDIC FOLLOW UP RECOMMENDATIONS: Patient is non weight bearing of the left upper extremity in sling until follow up appointment in the office within 1-2 weeks.

## 2021-09-13 NOTE — DISCHARGE NOTE PROVIDER - CARE PROVIDER_API CALL
Amy Rowley (DO)  Orthopaedic Surgery  [address] [place] [address]  Phone: [phone]  Fax: [phone]  Follow Up Time:

## 2021-09-13 NOTE — DISCHARGE NOTE PROVIDER - NSFOLLOWUPCLINICS_GEN_ALL_ED_FT
[facility] Acute Care Surgery  Acute Care Surgery  [address] [place] [id number]  Phone: [phone]  Fax:

## 2021-09-14 ENCOUNTER — TRANSCRIPTION ENCOUNTER (OUTPATIENT)
Age: 41
End: 2021-09-14

## 2021-09-14 VITALS
TEMPERATURE: 99 F | OXYGEN SATURATION: 97 % | SYSTOLIC BLOOD PRESSURE: 110 MMHG | HEART RATE: 70 BPM | DIASTOLIC BLOOD PRESSURE: 80 MMHG | RESPIRATION RATE: 18 BRPM

## 2021-09-14 PROCEDURE — 83735 ASSAY OF MAGNESIUM: CPT

## 2021-09-14 PROCEDURE — 97163 PT EVAL HIGH COMPLEX 45 MIN: CPT

## 2021-09-14 PROCEDURE — 36415 COLL VENOUS BLD VENIPUNCTURE: CPT

## 2021-09-14 PROCEDURE — 84100 ASSAY OF PHOSPHORUS: CPT

## 2021-09-14 PROCEDURE — 96374 THER/PROPH/DIAG INJ IV PUSH: CPT

## 2021-09-14 PROCEDURE — 85730 THROMBOPLASTIN TIME PARTIAL: CPT

## 2021-09-14 PROCEDURE — U0005: CPT

## 2021-09-14 PROCEDURE — 99231 SBSQ HOSP IP/OBS SF/LOW 25: CPT | Mod: GC

## 2021-09-14 PROCEDURE — 86900 BLOOD TYPING SEROLOGIC ABO: CPT

## 2021-09-14 PROCEDURE — 81001 URINALYSIS AUTO W/SCOPE: CPT

## 2021-09-14 PROCEDURE — 80048 BASIC METABOLIC PNL TOTAL CA: CPT

## 2021-09-14 PROCEDURE — 86901 BLOOD TYPING SEROLOGIC RH(D): CPT

## 2021-09-14 PROCEDURE — 80053 COMPREHEN METABOLIC PANEL: CPT

## 2021-09-14 PROCEDURE — 96375 TX/PRO/DX INJ NEW DRUG ADDON: CPT

## 2021-09-14 PROCEDURE — 82803 BLOOD GASES ANY COMBINATION: CPT

## 2021-09-14 PROCEDURE — 86769 SARS-COV-2 COVID-19 ANTIBODY: CPT

## 2021-09-14 PROCEDURE — 85025 COMPLETE CBC W/AUTO DIFF WBC: CPT

## 2021-09-14 PROCEDURE — 86850 RBC ANTIBODY SCREEN: CPT

## 2021-09-14 PROCEDURE — 90715 TDAP VACCINE 7 YRS/> IM: CPT

## 2021-09-14 PROCEDURE — 99285 EMERGENCY DEPT VISIT HI MDM: CPT | Mod: 25

## 2021-09-14 PROCEDURE — 90471 IMMUNIZATION ADMIN: CPT

## 2021-09-14 PROCEDURE — 80307 DRUG TEST PRSMV CHEM ANLYZR: CPT

## 2021-09-14 PROCEDURE — U0003: CPT

## 2021-09-14 PROCEDURE — 85610 PROTHROMBIN TIME: CPT

## 2021-09-14 PROCEDURE — 71045 X-RAY EXAM CHEST 1 VIEW: CPT

## 2021-09-14 PROCEDURE — 83605 ASSAY OF LACTIC ACID: CPT

## 2021-09-14 PROCEDURE — 83690 ASSAY OF LIPASE: CPT

## 2021-09-14 RX ADMIN — GABAPENTIN 300 MILLIGRAM(S): 400 CAPSULE ORAL at 05:45

## 2021-09-14 RX ADMIN — METHOCARBAMOL 750 MILLIGRAM(S): 500 TABLET, FILM COATED ORAL at 05:46

## 2021-09-14 RX ADMIN — LIDOCAINE 1 PATCH: 4 CREAM TOPICAL at 00:00

## 2021-09-14 RX ADMIN — Medication 975 MILLIGRAM(S): at 06:46

## 2021-09-14 RX ADMIN — OXYCODONE HYDROCHLORIDE 10 MILLIGRAM(S): 5 TABLET ORAL at 11:37

## 2021-09-14 RX ADMIN — Medication 975 MILLIGRAM(S): at 05:46

## 2021-09-14 RX ADMIN — Medication 1 APPLICATION(S): at 05:47
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
## 2021-09-14 NOTE — DISCHARGE NOTE NURSING/CASE MANAGEMENT/SOCIAL WORK - NSDCVIVACCINE_GEN_ALL_CORE_FT
Tdap; 10-Sep-2021 07:55; Lombardi, Ashley (HANNAH); [employer]; [id number] (Exp. Date: 18-Jun-2023); IntraMuscular; Deltoid Right.; 0.5 milliLiter(s); VIS (VIS Published: 09-May-2013, VIS Presented: 10-Sep-2021);
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
## 2021-09-14 NOTE — DISCHARGE NOTE NURSING/CASE MANAGEMENT/SOCIAL WORK - PATIENT PORTAL LINK FT
You can access the FollowMyHealth Patient Portal offered by [facility] by registering at the following website: [email or web address]. By joining [employer]’s FollowMyHealth portal, you will also be able to view your health information using other applications (apps) compatible with our system.

## 2021-09-16 PROBLEM — Z00.00 ENCOUNTER FOR PREVENTIVE HEALTH EXAMINATION: Status: ACTIVE | Noted: 2021-09-16

## 2021-09-16 PROBLEM — Z78.9 OTHER SPECIFIED HEALTH STATUS: Chronic | Status: ACTIVE | Noted: 2021-09-10

## 2021-09-23 ENCOUNTER — APPOINTMENT (OUTPATIENT)
Dept: TRAUMA SURGERY | Facility: CLINIC | Age: 41
End: 2021-09-23
Payer: COMMERCIAL

## 2021-09-23 VITALS
SYSTOLIC BLOOD PRESSURE: 128 MMHG | DIASTOLIC BLOOD PRESSURE: 74 MMHG | HEART RATE: 64 BPM | RESPIRATION RATE: 16 BRPM | TEMPERATURE: 98.4 F | HEIGHT: 70 IN | WEIGHT: 157 LBS | BODY MASS INDEX: 22.48 KG/M2 | OXYGEN SATURATION: 98 %

## 2021-09-23 DIAGNOSIS — Z78.9 OTHER SPECIFIED HEALTH STATUS: ICD-10-CM

## 2021-09-23 PROCEDURE — 99072 ADDL SUPL MATRL&STAF TM PHE: CPT

## 2021-09-23 PROCEDURE — 99213 OFFICE O/P EST LOW 20 MIN: CPT

## 2021-09-28 NOTE — ASSESSMENT
[FreeTextEntry1] : RTC 2 weeks  for  recheck\par Continue  use of spirometer\par Continue present  wound  care\par F/u  orthopedist \par Discussion with patient   All questions answered  Any acute symptoms and or concerns patient understands  to call back office  and  or  go  directly to [facility] ED\par Time  spent  with patient  25  minutes

## 2021-09-28 NOTE — REVIEW OF SYSTEMS
[Joint Stiffness] : joint stiffness [Skin Wound] : skin wound [Negative] : Psychiatric [FreeTextEntry6] : multiple  left  rib fractures [FreeTextEntry9] : left  shoulder/ clavicle  and scapula fx [de-identified] : skin abrasions  to arms

## 2021-09-28 NOTE — PHYSICAL EXAM
[Normal Breath Sounds] : Normal breath sounds [Normal Heart Sounds] : normal heart sounds [No Rash or Lesion] : No rash or lesion [Purpura] : no purpura  [Petechiae] : no petechiae [Skin Ulcer] : no ulcer [Skin Induration] : no induration [Alert] : alert [Oriented to Person] : oriented to person [Oriented to Place] : oriented to place [Oriented to Time] : oriented to time [Calm] : calm [de-identified] : wdwn  male  in  nad [de-identified] : PERRLA EOMS intact  and  nl  non icteric sclera [de-identified] : trachea  midline no nuchal rigidity [de-identified] : nl breath  sound  throughout  b/l  lung fields no  wheezing  no  rhonchi  no  palpable  stepoff  no crepitus     suture removal to left lateral chest wall from chest  tube   incision site c/d/i  no  signs  of  cellultis  no  discharge  [de-identified] : soft  no guarding [de-identified] : left  arm  in  shoulder immobilizer   nl pulse nl capillary refill slight   tenting of  skin to mid left clavicular  line   skin intact   [de-identified] : skin  abrasions to  forearms    healing  well  no discharge  no  bleeding     overall good granulation

## 2021-09-28 NOTE — VITALS
[Tender] : tender [Continuous] : continuous [FreeTextEntry3] : left  rib  left  clavicle  and  scapula [FreeTextEntry1] : rest [FreeTextEntry2] : movement    cough  sneeze
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
## 2021-10-07 ENCOUNTER — APPOINTMENT (OUTPATIENT)
Dept: TRAUMA SURGERY | Facility: CLINIC | Age: 41
End: 2021-10-07
Payer: COMMERCIAL

## 2021-10-07 VITALS
HEART RATE: 84 BPM | TEMPERATURE: 98.7 F | BODY MASS INDEX: 22.33 KG/M2 | OXYGEN SATURATION: 96 % | HEIGHT: 70 IN | WEIGHT: 156 LBS | SYSTOLIC BLOOD PRESSURE: 120 MMHG | DIASTOLIC BLOOD PRESSURE: 72 MMHG
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
## 2021-10-07 DIAGNOSIS — S42.109A FRACTURE OF UNSPECIFIED PART OF SCAPULA, UNSPECIFIED SHOULDER, INITIAL ENCOUNTER FOR CLOSED FRACTURE: ICD-10-CM

## 2021-10-07 DIAGNOSIS — S42.009A FRACTURE OF UNSPECIFIED PART OF UNSPECIFIED CLAVICLE, INITIAL ENCOUNTER FOR CLOSED FRACTURE: ICD-10-CM

## 2021-10-07 PROCEDURE — 99072 ADDL SUPL MATRL&STAF TM PHE: CPT

## 2021-10-07 PROCEDURE — 99213 OFFICE O/P EST LOW 20 MIN: CPT
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
## 2021-10-11 PROBLEM — S42.109A SCAPULA FRACTURE: Status: ACTIVE | Noted: 2021-09-28

## 2021-10-11 PROBLEM — S42.009A CLAVICULAR FRACTURE: Status: ACTIVE | Noted: 2021-09-28

## 2021-10-11 NOTE — VITALS
[Tender] : tender [Continuous] : continuous [Occasional] : occasional [FreeTextEntry3] : left  rib  left  clavicle  and  scapula [FreeTextEntry1] : rest [FreeTextEntry2] : movement    cough  sneeze
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
## 2021-10-11 NOTE — PHYSICAL EXAM
[Normal Breath Sounds] : Normal breath sounds [Normal Heart Sounds] : normal heart sounds [No Rash or Lesion] : No rash or lesion [Purpura] : no purpura  [Petechiae] : no petechiae [Skin Ulcer] : no ulcer [Skin Induration] : no induration [Alert] : alert [Oriented to Person] : oriented to person [Oriented to Place] : oriented to place [Oriented to Time] : oriented to time [Calm] : calm [de-identified] : wdwn  male  in  nad [de-identified] : PERRLA EOMS intact  and  nl  non icteric sclera [de-identified] : trachea  midline no nuchal rigidity [de-identified] : nl breath  sound  throughout  b/l  lung fields no  wheezing  no  rhonchi  no  palpable    stepoff  no crepitus    insertion site from chest  tube  c/d/i  no  signs  of  cellulitis       no  discharge  [de-identified] : soft  no guarding [de-identified] : left  arm  in  shoulder immobilizer   nl pulse nl capillary refill    tenting of  skin to mid     left clavicular  line   skin intact   [de-identified] : skin  abrasions to  forearms    healed  well  no discharge  no  bleeding     overall       good granulation

## 2021-10-11 NOTE — ASSESSMENT
[FreeTextEntry1] : RTC 2 weeks  for  recheck\par F/u  orthopedist\par Discussion with patient   All questions answered  Any acute symptoms and or concerns patient understands  to call back office  and  or  go  directly to [facility] ED\par \par \par Time  spent  with  patient  exam and  discussion 20  minutes

## 2021-10-11 NOTE — REVIEW OF SYSTEMS
[Joint Stiffness] : joint stiffness [Skin Wound] : skin wound [Negative] : Psychiatric [FreeTextEntry6] : multiple  left  rib fractures/pneumothorax [FreeTextEntry9] : left  shoulder/ clavicle  and scapula fx [de-identified] : skin abrasions  to arms

## 2021-10-26 ENCOUNTER — APPOINTMENT (OUTPATIENT)
Dept: TRAUMA SURGERY | Facility: CLINIC | Age: 41
End: 2021-10-26
Payer: COMMERCIAL

## 2021-10-26 VITALS
TEMPERATURE: 98.3 F | SYSTOLIC BLOOD PRESSURE: 111 MMHG | WEIGHT: 161.03 LBS | BODY MASS INDEX: 23.05 KG/M2 | OXYGEN SATURATION: 99 % | HEIGHT: 70 IN | DIASTOLIC BLOOD PRESSURE: 71 MMHG | HEART RATE: 53 BPM | RESPIRATION RATE: 14 BRPM

## 2021-10-26 DIAGNOSIS — S22.42XD MULTIPLE FRACTURES OF RIBS, LEFT SIDE, SUBSEQUENT ENCOUNTER FOR FRACTURE WITH ROUTINE HEALING: ICD-10-CM

## 2021-10-26 DIAGNOSIS — S27.0XXA TRAUMATIC PNEUMOTHORAX, INITIAL ENCOUNTER: ICD-10-CM

## 2021-10-26 PROCEDURE — 99212 OFFICE O/P EST SF 10 MIN: CPT

## 2021-10-26 PROCEDURE — 99072 ADDL SUPL MATRL&STAF TM PHE: CPT

## 2021-10-26 NOTE — HISTORY OF PRESENT ILLNESS
[de-identified] : left 4-9 lateral displaced rib fractures\par left 6-7 posterior nondisplaced rib fractures\par left traumatic pneumothorax s/p chest tube 9/10-9/13\par left comminuted clavicle fracture\par left acromial process fracture [de-identified] : no dyspnea\par no clicking in left chest wall\par \par saw Amy Vela (ortho) 1 week ago, and has f/u on 11/11.
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
## 2021-10-26 NOTE — PHYSICAL EXAM
[de-identified] : appears well [de-identified] : left chest wall nontender and ribs feel stable with healed fractures. chest tube site healed. [de-identified] : LUE in sling. left clavicle fracture healed, but  with skin tenting.

## 2024-01-18 NOTE — ED ADULT TRIAGE NOTE - SOURCE OF INFORMATION
[FreeTextEntry1] : Status post laparoscopic hernia repair followed by the development of esophageal ulcer and the finding of delayed gastric emptying.  Mild sinus tachycardia has resolved. EMS

## 2025-04-04 NOTE — ED PROVIDER NOTE - SCRIBE NAME
[facility] DISCHARGE SUMMARY    Patient ID:  Mihir Day [id number] 2000     Disposition:    Admit date: 3/31/2025    Discharge date:  4/4/2025      Admitting Physician: Mya Heart MD     Discharge Physician: Javier Moffett MD    Primary Discharge Diagnoses:     Bipolar disorder mixed type  Anxiety  Insomnia  Hospital Course:  The patient was prescribed Depakote extended release 1000 mg nightly, Zyprexa 5 mg nightly and trazodone 100 mg nightly as needed for difficulty sleeping.  His Depakote level from admission was low at 13 and the patient acknowledged he had not been taking his medications regularly prior to admission.  He was active in programming.  He reported significant improvement of his anxiety level, depressive symptoms, sleep with stabilization of his mood as his hospitalization progressed.  He noted no further thoughts of self-harm.  He felt stable to return home on 4/4 and was discharged per his request.  He was provided with a return to work note for Monday 4/7    Mental Status Exam:     APPEARANCE: Appropriately dressed  GROOMING: Normally groomed  ATTITUDE:  Appropriate  PSYCHOMOTOR STATE: Normal psychomotor activity  ABNORMAL MOVEMENTS OR POSTURE: Normal movements and posture  EYE CONTACT:  Appropriate  SPEECH: Normal rate and rhythm   MOOD: Euthymic   AFFECT: Full range  THOUGHT PROCESS:  Logical without any formal thought disorder  THOUGHT CONTENT: Normal thought content without delusions, hallucinations or perceptual disturbances  PERCEPTION: Normal   CONSCIOUSNESS:  Normal level of consciousness  ORIENTATION:  Oriented x3  MEMORY:  IMMEDIATE: Intact                        RECENT: Intact                      REMOTE: Intact  ATTENTION:  Normal attention span  CONCENTRATION: Normal  INSIGHT:  Intact  JUDGMENT:  Appropriate judgment  SUICIDAL IDEATIONS: No suicidal ideation  HOMICIDAL IDEATIONS: No homicidal ideation    Discharge Medications:  Current  Discharge Medication List        CONTINUE these medications which have CHANGED    Details   divalproex (DEPAKOTE ER) 500 MG 24 hr ER tablet Take 2 tablets by mouth nightly.  Qty: 60 tablet, Refills: 0      OLANZapine (ZyPREXA) 5 MG tablet Take 1 tablet by mouth nightly.  Qty: 30 tablet, Refills: 0      traZODone (DESYREL) 100 MG tablet Take 1 tablet by mouth nightly.  Qty: 30 tablet, Refills: 0           CONTINUE these medications which have NOT CHANGED    Details   MELATONIN PO Take 2 gummy (10 mg) by mouth as needed for sleep      ibuprofen (MOTRIN) 200 MG tablet Take 200 mg by mouth as needed for Pain.      FEXOFENADINE HCL PO Take 1 tablet by mouth as needed (allergies/congestion). Villegas Aller-flex      Calcium Carbonate Antacid (TUMS CHEWY BITES PO) Take 1 tablet by mouth every 6 hours as needed (upset stomach).      Vitamin D, Ergocalciferol, 84430 units Cap Take 1 capsule by mouth 1 day a week.  Qty: 12 capsule, Refills: 0    Associated Diagnoses: Vitamin D deficiency      clobetasol (OLUX) 0.05 % foam Apply 1 g topically 2 times daily as needed (Scalp rash until smooth 2 weeks on 1 week off).  Qty: 50 g, Refills: 11    Associated Diagnoses: Scalp psoriasis      Skyrizi Pen 150 MG/ML injection INJECT 1 PEN (150 MG) SUBCUTANEOUSLY AT WEEK 4, AND THEN EVERY 12 WEEKS THEREAFTER  Qty: 11 mL, Refills: 0    Associated Diagnoses: Psoriatic arthritis  (CMD)      triamcinolone (ARISTOCORT) 0.1 % ointment Apply topically 2 times daily as needed (Psoriasis). Use for 2 weeks on, and one week off.  Qty: 30 g, Refills: 3             Disposition:   See below      Follow-up:  SHAMAR Forrest  [address], [address] [address] [phone]  Follow up        Time spent on discharge was less than 30 minutes.    Signed:  Javier Moffett MD  4/4/2025  9:40 AM         Yash Ruiz